# Patient Record
Sex: MALE | Race: BLACK OR AFRICAN AMERICAN
[De-identification: names, ages, dates, MRNs, and addresses within clinical notes are randomized per-mention and may not be internally consistent; named-entity substitution may affect disease eponyms.]

---

## 2019-02-20 ENCOUNTER — HOSPITAL ENCOUNTER (EMERGENCY)
Dept: HOSPITAL 72 - EMR | Age: 19
Discharge: HOME | End: 2019-02-20
Payer: COMMERCIAL

## 2019-02-20 VITALS — SYSTOLIC BLOOD PRESSURE: 138 MMHG | DIASTOLIC BLOOD PRESSURE: 89 MMHG

## 2019-02-20 VITALS — DIASTOLIC BLOOD PRESSURE: 89 MMHG | SYSTOLIC BLOOD PRESSURE: 138 MMHG

## 2019-02-20 VITALS — WEIGHT: 155 LBS | BODY MASS INDEX: 23.49 KG/M2 | HEIGHT: 68 IN
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
## 2019-02-20 DIAGNOSIS — S43.005A: Primary | ICD-10-CM

## 2019-02-20 PROCEDURE — 96375 TX/PRO/DX INJ NEW DRUG ADDON: CPT

## 2019-02-20 PROCEDURE — 23650 CLTX SHO DSLC W/MNPJ WO ANES: CPT

## 2019-02-20 PROCEDURE — 96374 THER/PROPH/DIAG INJ IV PUSH: CPT

## 2019-02-20 PROCEDURE — 99283 EMERGENCY DEPT VISIT LOW MDM: CPT

## 2019-02-20 PROCEDURE — 73030 X-RAY EXAM OF SHOULDER: CPT

## 2019-02-20 PROCEDURE — 29105 APPLICATION LONG ARM SPLINT: CPT

## 2019-02-20 NOTE — DIAGNOSTIC IMAGING REPORT
Indication: Left shoulder pain after falling while rollerskating

 

Technique: 3 views of the left shoulder

 

Comparison: none

 

Findings: There is anterior dislocation of the left shoulder. No associated fracture.

 

Impression: Positive for left shoulder dislocation. No associated fracture

 

This was apparently recognized by the emergency room physician, as a subsequent post

reduction image was obtained

## 2019-02-20 NOTE — EMERGENCY ROOM REPORT
History of Present Illness


General


Chief Complaint:  Shoulder Injury


Source:  Patient





Present Illness


HPI


Is a 18-year-old male who is right-hand dominant.  He presents with chief 

complaint of left shoulder pain.  He was skateboarding and fell onto his left 

arm.  He felt pain to the left shoulder area.  Can't move it.  Pain is 10 out 

of 10.  Worse with movement.  Better with rest.  No head injury.  No other 

complaint.


Allergies:  


Coded Allergies:  


     No Known Allergies (Unverified , 2/20/19)





Patient History


Past Medical History:  none, see triage record, old chart reviewed


Past Surgical History:  none


Pertinent Family History:  none


Social History:  Denies: smoking


Immunizations:  other


Reviewed Nursing Documentation:  PMH: Agreed; PSxH: Agreed





Nursing Documentation-PMH


Past Medical History:  No Stated History





Review of Systems


Eye:  Denies: eye pain, blurred vision


ENT:  Denies: ear pain, nose congestion, throat swelling


Respiratory:  Denies: cough, shortness of breath


Cardiovascular:  Denies: chest pain, palpitations


Gastrointestinal:  Denies: abdominal pain, diarrhea, nausea, vomiting


Musculoskeletal:  Reports: joint pain; Denies: back pain


Skin:  Denies: rash


Neurological:  Denies: headache, numbness


Endocrine:  Denies: increased thirst, increased urine


Hematologic/Lymphatic:  Denies: easy bruising


All Other Systems:  negative except mentioned in HPI





Physical Exam





Vital Signs








  Date Time  Temp Pulse Resp B/P (MAP) Pulse Ox O2 Delivery O2 Flow Rate FiO2


 


2/20/19 00:29 98.8 114 18 138/89 100   





vitals normal


Sp02 EP Interpretation:  reviewed, normal


General Appearance:  well appearing, no apparent distress, alert


Head:  normocephalic, atraumatic


Eyes:  bilateral eye PERRL, bilateral eye EOMI


ENT:  hearing grossly normal, normal pharynx


Neck:  full range of motion, supple, no meningismus


Respiratory:  chest non-tender, lungs clear, normal breath sounds


Cardiovascular #1:  regular rate, rhythm, no murmur


Gastrointestinal:  normal bowel sounds, non tender, no mass, no organomegaly, 

no bruit, non-distended


Musculoskeletal:  back normal, gait/station normal, other - Left shoulder with 

deformity consistent with anterior dislocation.  Sensation over deltoid is 

normal.  Elbow is nontender.  Wrist nontender.


Psychiatric:  mood/affect normal


Skin:  warm/dry





Procedures


Splinting


Splinting :  


   Consent:  Verbal


   Location:  Left shoulder


   Pre-Made Type:  Shoulder immobilization


   Pre-Proc Neuro Vasc Exam:  normal


   Post-Proc Neuro Vasc Exam:  normal


   Patient Tolerated:  Well


   Complications:  None





Joint Reduction


Joint Reduction :  


   Consent:  Verbal


   Joint Reduction Site:  shoulder (L)


   Procedural Sedation:  No


   Reduction Attempts:  One


   Pre-Procedure NV Exam:  Yes


   Post-Procedure NV Exam:  Yes


   Post Joint Reduction Film:  joint reduced


   Patient Tolerated:  Well


   Complications:  None


Progress


I did an intra-articular block with 1% lidocaine without epinephrine.  I 

injected 10 mL.  Patient shoulder was reduced with gentle external rotation.  

Patient tolerated procedure without a problem.





Medical Decision Making


Diagnostic Impression:  


 Primary Impression:  


 Anterior dislocation of left shoulder


 Qualified Codes:  S43.015A - Anterior dislocation of left humerus, initial 

encounter


ER Course


Patient with shoulder dislocation.  Reduced without a problem.  No evidence of 

any fracture or nerve injury.


Other X-Ray Diagnostic Results


Other X-Ray Diagnostic Results #1:  


   X-Ray ordered:  Left shoulder x-rays


   # of Views/Limited Vs Complete:  3 View


   Indication:  Pain


   EP Interpretation:  Yes


   Interpretation:  no soft tissue swelling, no fractures, other - Anterior 

shoulder dislocation


   Impression:  Other - anterior shoulder dislocation


   Electronically Signed by:  Tushar Bhatt MD


Other X-Ray Diagnostic Results #2:  


   X-Ray ordered:  Left shoulder xrays


   # of Views/Limited Vs Complete:  3 View


   Indication:  Other - Post reduction


   EP Interpretation:  Yes


   Interpretation:  no dislocation, no soft tissue swelling, no fractures


   Impression:  No acute disease - successful reduction. no frx


   Electronically Signed by:  Tushar Bhatt MD





Last Vital Signs








  Date Time  Temp Pulse Resp B/P (MAP) Pulse Ox O2 Delivery O2 Flow Rate FiO2


 


2/20/19 00:33 98.8 114 18 138/89 100   








Status:  improved


Disposition:  HOME, SELF-CARE


Condition:  Stable


Scripts


Ibuprofen* (MOTRIN*) 600 Mg Tablet


600 MG ORAL THREE TIMES A DAY, #30 TAB 0 Refills


   Prov: Tushar Bhatt MD         2/20/19 


Hydrocodone/Acetaminophen 5-325* (HYDROCODONE/ACETAMINOPHEN 5-325*) 1 Each 

Tablet


1 TAB ORAL Q6H PRN for For Pain, #10 TAB 0 Refills


   Prov: Tushar Bhatt MD         2/20/19











Tushar Bhatt MD Feb 20, 2019 01:06

## 2019-02-20 NOTE — DIAGNOSTIC IMAGING REPORT
Indication: Post reduction

 

Technique: 3 views of the left shoulder

 

Comparison: One half hour earlier

 

Findings: Interim reduction of previously demonstrated left shoulder dislocation. No

underlying fracture demonstrated

 

Impression: Successful reduction of previously demonstrated left shoulder dislocation

## 2024-12-22 ENCOUNTER — HOSPITAL ENCOUNTER (EMERGENCY)
Dept: HOSPITAL 87 - ER | Age: 24
LOS: 1 days | Discharge: HOME | End: 2024-12-23
Payer: COMMERCIAL

## 2024-12-22 VITALS — BODY MASS INDEX: 25.8 KG/M2 | HEIGHT: 69 IN | WEIGHT: 174.17 LBS

## 2024-12-22 VITALS — OXYGEN SATURATION: 99 % | TEMPERATURE: 98.1 F

## 2024-12-22 DIAGNOSIS — J45.909: ICD-10-CM

## 2024-12-22 DIAGNOSIS — Z98.890: ICD-10-CM

## 2024-12-22 DIAGNOSIS — I31.9: Primary | ICD-10-CM

## 2024-12-22 PROCEDURE — 71045 X-RAY EXAM CHEST 1 VIEW: CPT

## 2024-12-22 PROCEDURE — 83880 ASSAY OF NATRIURETIC PEPTIDE: CPT

## 2024-12-22 PROCEDURE — 85730 THROMBOPLASTIN TIME PARTIAL: CPT

## 2024-12-22 PROCEDURE — 85651 RBC SED RATE NONAUTOMATED: CPT

## 2024-12-22 PROCEDURE — 96365 THER/PROPH/DIAG IV INF INIT: CPT

## 2024-12-22 PROCEDURE — G0480 DRUG TEST DEF 1-7 CLASSES: HCPCS

## 2024-12-22 PROCEDURE — 80048 BASIC METABOLIC PNL TOTAL CA: CPT

## 2024-12-22 PROCEDURE — 36415 COLL VENOUS BLD VENIPUNCTURE: CPT

## 2024-12-22 PROCEDURE — 93005 ELECTROCARDIOGRAM TRACING: CPT

## 2024-12-22 PROCEDURE — 80305 DRUG TEST PRSMV DIR OPT OBS: CPT

## 2024-12-22 PROCEDURE — 99285 EMERGENCY DEPT VISIT HI MDM: CPT

## 2024-12-22 PROCEDURE — 85379 FIBRIN DEGRADATION QUANT: CPT

## 2024-12-22 PROCEDURE — 85025 COMPLETE CBC W/AUTO DIFF WBC: CPT

## 2024-12-22 PROCEDURE — 84484 ASSAY OF TROPONIN QUANT: CPT

## 2024-12-22 PROCEDURE — 80320 DRUG SCREEN QUANTALCOHOLS: CPT

## 2024-12-22 PROCEDURE — 85610 PROTHROMBIN TIME: CPT

## 2024-12-23 VITALS
SYSTOLIC BLOOD PRESSURE: 100 MMHG | OXYGEN SATURATION: 98 % | HEART RATE: 61 BPM | RESPIRATION RATE: 13 BRPM | DIASTOLIC BLOOD PRESSURE: 53 MMHG

## 2024-12-23 LAB
AMPHETAMINES UR QL SCN: NEGATIVE
APTT PPP: 25.6 SEC (ref 23.4–31)
BARBITURATES UR QL SCN: NEGATIVE
BASOPHILS NFR BLD AUTO: 0.7 % (ref 0–2)
BENZODIAZ UR QL SCN: NEGATIVE
BUN SERPL-MCNC: 10 MG/DL (ref 9–23)
BZE UR QL SCN: NEGATIVE
CALCIUM SERPL-MCNC: 9.4 MG/DL (ref 8.7–10.4)
CANNABINOIDS UR QL SCN: NEGATIVE
CARDIAC TROPONIN I PNL SERPL HS: 11 NG/L (ref 3–53)
CARDIAC TROPONIN I PNL SERPL HS: 16 NG/L (ref 3–53)
CHLORIDE SERPL-SCNC: 107 MEQ/L (ref 98–107)
CO2 SERPL-SCNC: 25 MEQ/L (ref 21–32)
CREAT SERPL-MCNC: 1 MG/DL (ref 0.6–1.3)
EOSINOPHIL NFR BLD AUTO: 2 % (ref 0–5)
ERYTHROCYTE [DISTWIDTH] IN BLOOD BY AUTOMATED COUNT: 13.1 % (ref 11.6–14.6)
ERYTHROCYTE [SEDIMENTATION RATE] IN BLOOD: 2 MM/HR (ref 0–15)
ETHANOL SERPL-MCNC: 14 MG/DL (ref ?–10)
GLUCOSE SERPL-MCNC: 85 MG/DL (ref 70–105)
HCT VFR BLD AUTO: 46.1 % (ref 42–52)
HGB BLD-MCNC: 15.7 G/DL (ref 14–18)
INR PPP: 1
LYMPHOCYTES NFR BLD AUTO: 69.3 % (ref 20–50)
MANUAL DIF COMMENT BLD-IMP: 1
MCH RBC QN AUTO: 32.7 PG (ref 28–32)
MCHC RBC AUTO-ENTMCNC: 34.2 G/DL (ref 31–37)
MCV RBC AUTO: 95.8 FL (ref 80–94)
MDMA UR QL SCN: NEGATIVE
METHADONE UR QL SCN: NEGATIVE
MONOCYTES NFR BLD AUTO: 7.4 % (ref 2–8)
NEUTROPHILS NFR BLD AUTO: 20.6 % (ref 40–76)
OPIATES UR QL SCN: NEGATIVE
PCP UR QL SCN: NEGATIVE
PLATELET # BLD AUTO: 130 X1000/UL (ref 130–400)
PMV BLD AUTO: 12.4 FL (ref 7.4–10.4)
POTASSIUM SERPL-SCNC: 3.7 MEQ/L (ref 3.5–5.1)
PROTHROMBIN TIME: 11 SEC (ref 9.6–11)
RBC # BLD AUTO: 4.81 MILL/UL (ref 4.7–6.1)
SODIUM SERPL-SCNC: 141 MEQ/L (ref 136–145)
WBC # BLD: 6.6 X1000/UL (ref 4.5–11)

## 2024-12-23 RX ADMIN — ACETAMINOPHEN ONE MLS/HR: 1000 INJECTION INTRAVENOUS at 00:24
